# Patient Record
Sex: MALE | Race: WHITE | NOT HISPANIC OR LATINO | Employment: FULL TIME | URBAN - NONMETROPOLITAN AREA
[De-identification: names, ages, dates, MRNs, and addresses within clinical notes are randomized per-mention and may not be internally consistent; named-entity substitution may affect disease eponyms.]

---

## 2023-11-24 ENCOUNTER — OFFICE VISIT (OUTPATIENT)
Dept: URGENT CARE | Facility: CLINIC | Age: 63
End: 2023-11-24
Payer: COMMERCIAL

## 2023-11-24 VITALS
RESPIRATION RATE: 18 BRPM | HEART RATE: 91 BPM | DIASTOLIC BLOOD PRESSURE: 84 MMHG | TEMPERATURE: 98.4 F | OXYGEN SATURATION: 95 % | SYSTOLIC BLOOD PRESSURE: 138 MMHG

## 2023-11-24 DIAGNOSIS — H92.01 OTALGIA, RIGHT: Primary | ICD-10-CM

## 2023-11-24 DIAGNOSIS — J34.89 RHINORRHEA: ICD-10-CM

## 2023-11-24 DIAGNOSIS — J02.9 ACUTE PHARYNGITIS, UNSPECIFIED ETIOLOGY: ICD-10-CM

## 2023-11-24 PROCEDURE — 99203 OFFICE O/P NEW LOW 30 MIN: CPT

## 2023-11-24 RX ORDER — HYDROCHLOROTHIAZIDE 12.5 MG/1
1 CAPSULE, GELATIN COATED ORAL EVERY MORNING
COMMUNITY
Start: 2023-06-06

## 2023-11-24 RX ORDER — CYANOCOBALAMIN (VITAMIN B-12) 500 MCG
TABLET ORAL
COMMUNITY

## 2023-11-24 RX ORDER — ASPIRIN 81 MG/1
81 TABLET, CHEWABLE ORAL DAILY
COMMUNITY

## 2023-11-24 RX ORDER — CRANBERRY FRUIT EXTRACT 200 MG
2 CAPSULE ORAL DAILY
COMMUNITY

## 2023-11-24 RX ORDER — VALSARTAN 320 MG/1
320 TABLET ORAL DAILY
COMMUNITY
Start: 2023-03-06 | End: 2024-03-05

## 2023-11-24 RX ORDER — CYCLOSPORINE 0.5 MG/ML
1 EMULSION OPHTHALMIC 2 TIMES DAILY
COMMUNITY

## 2023-11-24 NOTE — PATIENT INSTRUCTIONS
Continue with prescribed antibiotic eardrops as given by your ENT. Continue with Nasonex prescribed nasal spray with nasal saline rinses twice daily. May try warm tea with honey, teaspoon of honey, throat lozenges, warm salt gargles to help with your sore throat. Recommended calling her ENT for further evaluation if no improvement in drainage or pain. Follow up with PCP in 3-5 days. Proceed to  ER if symptoms worsen. Rhinosinusitis   WHAT YOU NEED TO KNOW:   Rhinosinusitis (RS) is inflammation or infection of your nasal passages and sinuses. RS is most often caused by a virus but may be caused by bacteria. Acute RS lasts up to 12 weeks. Chronic RS lasts more than 12 weeks. Recurrent RS means you have 4 or more infections in 1 year. DISCHARGE INSTRUCTIONS:   Return to the emergency department if:   You have trouble breathing, or wheezing that is getting worse. You have a stiff neck, a fever, or a bad headache. You cannot open your eye. Your eyeball bulges out, or you cannot move your eye. You are more sleepy than usual, or you notice changes in your ability to think, move, or talk. You have swelling of your forehead or scalp. Call your doctor if:   You have vision changes, such as double vision. Your eye and eyelid are red, swollen, and painful. Your symptoms do not improve after 10 days. You have nausea and are vomiting. Your nose is bleeding. You have questions or concerns about your condition or care. Medicines: Your symptoms may go away on their own. Your healthcare provider may recommend watchful waiting for up to 10 days before starting antibiotics. Antibiotics will not help if the infection is caused by a virus. Check with your provider before you take any over-the-counter medicines. You may need any of the following:  Acetaminophen  decreases pain and fever. It is available without a doctor's order. Ask how much to take and how often to take it.  Follow directions. Read the labels of all other medicines you are using to see if they also contain acetaminophen, or ask your doctor or pharmacist. Acetaminophen can cause liver damage if not taken correctly. NSAIDs , such as ibuprofen, help decrease swelling, pain, and fever. This medicine is available with or without a doctor's order. NSAIDs can cause stomach bleeding or kidney problems in certain people. If you take blood thinner medicine, always ask your healthcare provider if NSAIDs are safe for you. Always read the medicine label and follow directions. Nasal steroid sprays  help decrease inflammation in your nose and sinuses. Decongestants  help reduce swelling and drain mucus in the nose and sinuses. They may help you breathe easier. Do not use decongestants for more than 3 days. Antihistamines  help dry mucus in the nose and relieve sneezing. Antibiotics  help treat or prevent a bacterial infection. Self-care:   Rinse your sinuses as directed. Use a sinus rinse device to rinse your nasal passages with a saline (salt water) solution or distilled water. Do not  use tap water. A sinus rinse will help thin the mucus in your nose and rinse away pollen and dirt. It will also help lower swelling so you can breathe normally. Use a humidifier  to increase air moisture in your home. Moist air may make it easier for you to breathe and help decrease your cough. Sleep with your head raised. Place an extra pillow under your head before you go to sleep to help your sinuses drain. Drink liquids as directed. Ask your healthcare provider how much liquid to drink each day and which liquids are best for you. Liquids will thin the mucus in your nose and help it drain. Do not have drinks that contain alcohol or caffeine. Do not smoke, and avoid secondhand smoke. Nicotine and other chemicals in cigarettes and cigars can make your symptoms worse.  Ask your healthcare provider for information if you currently smoke and need help to quit. E-cigarettes or smokeless tobacco still contain nicotine. Talk to your healthcare provider before you use these products. Prevent the spread of germs:   Wash your hands often with soap and water. Wash your hands after you use the bathroom, change a child's diaper, or sneeze. Wash your hands before you prepare or eat food. Stay away from people who are sick. Some germs spread easily and quickly through contact. Follow up with your doctor as directed: You may be referred to an ear, nose, and throat specialist. Write down your questions so you remember to ask them during your visits. © Copyright [facility] 2023 Information is for End User's use only and may not be sold, redistributed or otherwise used for commercial purposes. The above information is an  only. It is not intended as medical advice for individual conditions or treatments. Talk to your doctor, nurse or pharmacist before following any medical regimen to see if it is safe and effective for you. Earache   DISCHARGE INSTRUCTIONS:   Return to the emergency department if:   You have a severe earache. You have ear pain with itching, hearing loss, dizziness, a feeling of fullness in your ear, or ringing in your ears. Call your doctor if:   Your ear pain worsens or does not go away with treatment. You have drainage from your ear. You have a fever. Your outer ear becomes red, swollen, and warm. You have questions or concerns about your condition or care. Medicines: You may need any of the following:  Acetaminophen  decreases pain and fever. It is available without a doctor's order. Ask how much to take and how often to take it. Follow directions. Read the labels of all other medicines you are using to see if they also contain acetaminophen, or ask your doctor or pharmacist. Acetaminophen can cause liver damage if not taken correctly.     NSAIDs , such as ibuprofen, help decrease swelling, pain, and fever. This medicine is available with or without a doctor's order. NSAIDs can cause stomach bleeding or kidney problems in certain people. If you take blood thinner medicine, always ask your healthcare provider if NSAIDs are safe for you. Always read the medicine label and follow directions. Do not give aspirin to children younger than 18 years. Your child could develop Reye syndrome if he or she has the flu or a fever and takes aspirin. Reye syndrome can cause life-threatening brain and liver damage. Check your child's medicine labels for aspirin or salicylates. Take your medicine as directed. Contact your healthcare provider if you think your medicine is not helping or if you have side effects. Tell your provider if you are allergic to any medicine. Keep a list of the medicines, vitamins, and herbs you take. Include the amounts, and when and why you take them. Bring the list or the pill bottles to follow-up visits. Carry your medicine list with you in case of an emergency. Follow up with your doctor as directed:  Write down your questions so you remember to ask them during your visits. © Copyright [employer] 2023 Information is for End User's use only and may not be sold, redistributed or otherwise used for commercial purposes. The above information is an  only. It is not intended as medical advice for individual conditions or treatments. Talk to your doctor, nurse or pharmacist before following any medical regimen to see if it is safe and effective for you.

## 2023-11-24 NOTE — PROGRESS NOTES
[facility]        NAME: Addie Torre is a 61 y.o. male  : 1960    MRN: [id number]  DATE: 2023  TIME: 2:26 PM    Assessment and Plan   Otalgia, right [H92.01]  1. Otalgia, right        2. Rhinorrhea        3. Acute pharyngitis, unspecified etiology            Unable to view surgical notes for right PE tube placement on . Pt currently on ear drops for right ear s/p tube placement. PT believes he is taking ofloxacin otic eardrops. On exam-right ear tube is in place. No signs of infection. No active drainage or signs of otitis externa. Advised to continue with eardrops and continue with nasal spray plus nasal saline rinses twice daily. Advised follow-up with ENT/family doctor. Advised to go to the ER if any symptoms worsen. Patient Instructions     Continue with prescribed antibiotic eardrops as given by your ENT. Continue with Nasonex prescribed nasal spray with nasal saline rinses twice daily. May try warm tea with honey, teaspoon of honey, throat lozenges, warm salt gargles to help with your sore throat. Recommended calling her ENT for further evaluation if no improvement in drainage or pain. Follow up with PCP in 3-5 days. Proceed to  ER if symptoms worsen. Chief Complaint     Chief Complaint   Patient presents with    Earache     Monday had ear tube replaced on right side. Drainage turned yellow and now complaining of sinus pressure and throat pain. Called PCP and ENT and was referred to urgent care. Using ear drops prescribed post surgically x 3 days. History of Present Illness       59-year-old male presents the clinic with right ear pain this been going on for the past few days. PT states he had chronic right ear issues and had a tube placed in his right ear on . PT states he is having a yellow discharge from the ear on the right side. PT states he is now sinus pressure and a sore throat.   PT is currently using eardrops as prescribed postsurgery. Patient called his ENT and family doctor today and was  deferred to urgent care. PT has Nasonex at home which he uses for upper respiratory symptoms. Denies any fever, chills, chest pain, shortness of breath. Earache   Associated symptoms include ear discharge, rhinorrhea and a sore throat. Review of Systems   Review of Systems   Constitutional: Negative. HENT:  Positive for congestion, ear discharge, ear pain, postnasal drip, rhinorrhea, sinus pressure and sore throat. Eyes: Negative. Respiratory: Negative. Cardiovascular: Negative. Musculoskeletal: Negative. Neurological: Negative. Current Medications       Current Outpatient Medications:     aspirin 81 mg chewable tablet, Chew 81 mg daily, Disp: , Rfl:     cycloSPORINE (RESTASIS) 0.05 % ophthalmic emulsion, Administer 1 drop to both eyes 2 (two) times a day, Disp: , Rfl:     hydrochlorothiazide (MICROZIDE) 12.5 mg capsule, Take 1 capsule by mouth every morning, Disp: , Rfl:     Omega-3 Fatty Acids (Fish Oil) 300 MG CAPS, Take by mouth, Disp: , Rfl:     Red Yeast Rice Extract 600 MG CAPS, Take 2 capsules by mouth daily, Disp: , Rfl:     valsartan (DIOVAN) 320 MG tablet, Take 320 mg by mouth daily, Disp: , Rfl:     Current Allergies     Allergies as of 11/24/2023 - Reviewed 11/24/2023   Allergen Reaction Noted    Pollen extract Sneezing 06/11/2021            The following portions of the patient's history were reviewed and updated as appropriate: allergies, current medications, past family history, past medical history, past social history, past surgical history and problem list.     Past Medical History:   Diagnosis Date    Hypertension        Past Surgical History:   Procedure Laterality Date    TYMPANOSTOMY TUBE PLACEMENT         No family history on file. Medications have been verified.         Objective   /84 (BP Location: Left arm)   Pulse 91   Temp 98.4 °F (36.9 °C) (Skin)   Resp 18 SpO2 95%        Physical Exam     Physical Exam  Constitutional:       General: He is not in acute distress. Appearance: Normal appearance. He is not ill-appearing or diaphoretic. HENT:      Head: Normocephalic and atraumatic. Right Ear: Ear canal and external ear normal. No drainage, swelling or tenderness. No middle ear effusion. There is no impacted cerumen. No mastoid tenderness. A PE tube is present. Tympanic membrane is not erythematous or bulging. Left Ear: Tympanic membrane, ear canal and external ear normal.      Nose: Congestion and rhinorrhea present. Right Sinus: No maxillary sinus tenderness or frontal sinus tenderness. Left Sinus: No maxillary sinus tenderness or frontal sinus tenderness. Mouth/Throat:      Mouth: Mucous membranes are moist.      Pharynx: Oropharynx is clear. Posterior oropharyngeal erythema (Mild with postnasal drip.) present. No oropharyngeal exudate. Eyes:      Extraocular Movements: Extraocular movements intact. Conjunctiva/sclera: Conjunctivae normal.      Pupils: Pupils are equal, round, and reactive to light. Cardiovascular:      Rate and Rhythm: Normal rate and regular rhythm. Pulses: Normal pulses. Heart sounds: Normal heart sounds. Pulmonary:      Effort: Pulmonary effort is normal. No respiratory distress. Breath sounds: Normal breath sounds. No wheezing, rhonchi or rales. Chest:      Chest wall: No tenderness. Musculoskeletal:      Cervical back: Normal range of motion and neck supple. Lymphadenopathy:      Cervical: No cervical adenopathy. Skin:     General: Skin is warm and dry. Capillary Refill: Capillary refill takes less than 2 seconds. Findings: No rash. Neurological:      General: No focal deficit present. Mental Status: He is alert and oriented to person, place, and time. Mental status is at baseline.    Psychiatric:         Mood and Affect: Mood normal.         Behavior: Behavior normal.